# Patient Record
Sex: MALE | Race: WHITE | Employment: OTHER | URBAN - METROPOLITAN AREA
[De-identification: names, ages, dates, MRNs, and addresses within clinical notes are randomized per-mention and may not be internally consistent; named-entity substitution may affect disease eponyms.]

---

## 2022-12-22 ENCOUNTER — HOSPITAL ENCOUNTER (EMERGENCY)
Age: 57
Discharge: HOME | End: 2022-12-22
Attending: STUDENT IN AN ORGANIZED HEALTH CARE EDUCATION/TRAINING PROGRAM
Payer: COMMERCIAL

## 2022-12-22 VITALS
TEMPERATURE: 98.2 F | RESPIRATION RATE: 18 BRPM | BODY MASS INDEX: 23.03 KG/M2 | OXYGEN SATURATION: 98 % | WEIGHT: 170 LBS | DIASTOLIC BLOOD PRESSURE: 91 MMHG | SYSTOLIC BLOOD PRESSURE: 141 MMHG | HEIGHT: 72 IN | HEART RATE: 66 BPM

## 2022-12-22 DIAGNOSIS — S01.81XA FACIAL LACERATION, INITIAL ENCOUNTER: Primary | ICD-10-CM

## 2022-12-22 PROCEDURE — 74011250636 HC RX REV CODE- 250/636: Performed by: STUDENT IN AN ORGANIZED HEALTH CARE EDUCATION/TRAINING PROGRAM

## 2022-12-22 PROCEDURE — 99284 EMERGENCY DEPT VISIT MOD MDM: CPT

## 2022-12-22 PROCEDURE — 74011000250 HC RX REV CODE- 250: Performed by: STUDENT IN AN ORGANIZED HEALTH CARE EDUCATION/TRAINING PROGRAM

## 2022-12-22 PROCEDURE — 90471 IMMUNIZATION ADMIN: CPT

## 2022-12-22 PROCEDURE — 90714 TD VACC NO PRESV 7 YRS+ IM: CPT | Performed by: STUDENT IN AN ORGANIZED HEALTH CARE EDUCATION/TRAINING PROGRAM

## 2022-12-22 PROCEDURE — 75810000293 HC SIMP/SUPERF WND  RPR

## 2022-12-22 RX ORDER — BACITRACIN 500 UNIT/G
1 PACKET (EA) TOPICAL
Status: COMPLETED | OUTPATIENT
Start: 2022-12-22 | End: 2022-12-22

## 2022-12-22 RX ADMIN — TETANUS AND DIPHTHERIA TOXOIDS ADSORBED 0.5 ML: 2; 2 INJECTION INTRAMUSCULAR at 08:43

## 2022-12-22 RX ADMIN — Medication 2 ML: at 08:44

## 2022-12-22 RX ADMIN — Medication 1 PACKET: at 08:45

## 2022-12-22 NOTE — ED TRIAGE NOTES
Pt states he walked into a piece of material sticking out of his truck this am while it was dark. Pt has a laceration to the right side of the face. Pt denies loc. Does not take any blood thinners. Pt does not recall last tetanus shot. Pt denies dizziness.

## 2022-12-22 NOTE — ED PROVIDER NOTES
WOUND REPAIR    Date/Time: 12/22/2022 10:03 AM  [id number] Dr. Mustafa Artist  Preparation: skin prepped with ChloraPrep  Location details: face  Wound length:2.6 - 7.5 cm    Anesthesia:  Local Anesthetic: LET (lido, epi, tetracaine)  Foreign bodies: no foreign bodies  Irrigation solution: saline  Irrigation method: jet lavage  Skin closure: 4-0 nylon  Number of sutures: 4  Technique: simple  Approximation: close  My total time at bedside, performing this procedure was 1-15 minutes. Comments: Gave instructions for wound care and recommend suture removal in 5 days.

## 2022-12-22 NOTE — ED PROVIDER NOTES
Patient is otherwise healthy 77-year-old male who was walking at his work truck when he caught the right side of his face and his aluminum resulted in a skin avulsion of the forehead, laceration of the right temple and a small laceration to the right ear. Wound is hemostatic at this time. Patient's Tdap is out of date. The history is provided by the patient and the spouse. Head Injury   The incident occurred just prior to arrival. The incident occurred at home. The injury mechanism was a cut/puncture wound. The injury was related to work. The wounds were self-inflicted. No protective equipment was used. He came to the ER via personal transport. There is an injury to the Head. The pain is mild. It is unlikely that a foreign body is present. There is no possibility that he inhaled smoke. Associated symptoms include headaches. His tetanus status is unknown. He has been Behaving normally. There were no sick contacts. He has received no recent medical care. No past medical history on file. No past surgical history on file. No family history on file.     Social History     Socioeconomic History    Marital status:      Spouse name: Not on file    Number of children: Not on file    Years of education: Not on file    Highest education level: Not on file   Occupational History    Not on file   Tobacco Use    Smoking status: Not on file    Smokeless tobacco: Not on file   Substance and Sexual Activity    Alcohol use: Not on file    Drug use: Not on file    Sexual activity: Not on file   Other Topics Concern    Not on file   Social History Narrative    Not on file     Social Determinants of Health     Financial Resource Strain: Not on file   Food Insecurity: Not on file   Transportation Needs: Not on file   Physical Activity: Not on file   Stress: Not on file   Social Connections: Not on file   Intimate Partner Violence: Not on file   Housing Stability: Not on file         ALLERGIES: Patient has no known allergies. Review of Systems   Constitutional: Negative. HENT: Negative. Eyes: Negative. Respiratory: Negative. Cardiovascular: Negative. Gastrointestinal: Negative. Endocrine: Negative. Genitourinary: Negative. Musculoskeletal: Negative. Skin:  Positive for wound. Allergic/Immunologic: Negative. Neurological:  Positive for headaches. Hematological: Negative. Psychiatric/Behavioral: Negative. Vitals:    12/22/22 0758 12/22/22 0759 12/22/22 0819   BP: (!) 141/91     Pulse: 66 66    Resp: 16 18    Temp: 98.2 °F (36.8 °C)     SpO2: 97% 98% 98%   Weight: 80.7 kg (178 lb) 77.1 kg (170 lb)    Height: 6' (1.829 m) 6' (1.829 m)             Physical Exam  Vitals and nursing note reviewed. Constitutional:       General: He is not in acute distress. Appearance: Normal appearance. HENT:      Head: Normocephalic. Right Ear: External ear normal.      Left Ear: External ear normal.      Nose: Nose normal.   Eyes:      Extraocular Movements: Extraocular movements intact. Conjunctiva/sclera: Conjunctivae normal.   Cardiovascular:      Rate and Rhythm: Normal rate. Pulses: Normal pulses. Radial pulses are 2+ on the right side and 2+ on the left side. Heart sounds: Normal heart sounds. Pulmonary:      Effort: Pulmonary effort is normal.      Breath sounds: Normal breath sounds. Chest:      Chest wall: No deformity or tenderness. Abdominal:      General: Abdomen is flat. There is no distension. Tenderness: There is no abdominal tenderness. Musculoskeletal:         General: No deformity or signs of injury. Normal range of motion. Cervical back: Normal range of motion and neck supple. No tenderness. Skin:     General: Skin is warm and dry. Capillary Refill: Capillary refill takes less than 2 seconds. Findings: Laceration present. Neurological:      General: No focal deficit present.       Mental Status: He is alert and oriented to person, place, and time. Psychiatric:         Attention and Perception: Attention normal.         Mood and Affect: Mood normal.         Behavior: Behavior normal.        MDM         MEDICATIONS GIVEN:  Medications   tetanus-diphtheria toxoids-Td (Td) 2-2 Lf unit/0.5 mL injection 0.5 mL (0.5 mL IntraMUSCular Given 12/22/22 0843)   lidocaine/EPINEPHrine/tetracaine (L.E.T) topical gel (2 mL Topical Given 12/22/22 0844)   bacitracin 500 unit/gram packet 1 Packet (1 Packet Topical Given 12/22/22 0845)       Differential diagnosis: Headache, concussion, laceration, avulsion    MDM: Patient is a 59-year-old male presented ED with a laceration to the right temple, avulsion to the right forehead and small laceration to the right ear. Doubt head bleed or significant head injury. Patient has minor headache. Tetanus updated. Let gel applied. Laceration repaired per procedure note by KARY Arnold. Further personalized recommendations for outpatient care as below. Key discharge instructions and summary of care: You presented to the ED after receiving facial trauma. Laceration was repaired with 4 nonabsorbable sutures that should be removed in 5 to 7 days. Recommend applying bacitracin twice a day. Keep wounds covered when in the sun for the next 6 months to prevent scarring. Take Tylenol for headache. If you have worsening headache symptoms you could have a concussion, this can cause dizziness, blurred vision, nausea. If symptoms progress and do not resolve in 10 to 14 days follow-up with concussion physical therapy. Patient is stable for discharge. All available radiology and laboratory results have been reviewed with patient and/or available family.   Patient and/or family verbally conveyed their understanding and agreement of the patient's signs, symptoms, diagnosis, treatment and prognosis and additionally agree to follow-up as recommended in the discharge instructions or to return to the Emergency Department should their condition change or worsen prior to their follow-up appointment. All questions have been answered and patient and/or available family express understanding. IMPRESSION:  1. Facial laceration, initial encounter        DISPOSITION: Discharged    Souleymane Urban MD    Procedures

## 2022-12-22 NOTE — DISCHARGE INSTRUCTIONS
You presented to the ED after receiving facial trauma. Laceration was repaired with 4 nonabsorbable sutures that should be removed in 5 to 7 days. Recommend applying bacitracin twice a day. Keep wounds covered when in the sun for the next 6 months to prevent scarring. Take Tylenol for headache. If you have worsening headache symptoms you could have a concussion, this can cause dizziness, blurred vision, nausea. If symptoms progress and do not resolve in 10 to 14 days follow-up with concussion physical therapy.

## 2024-12-03 ENCOUNTER — OFFICE VISIT (OUTPATIENT)
Age: 59
End: 2024-12-03

## 2024-12-03 VITALS
DIASTOLIC BLOOD PRESSURE: 86 MMHG | BODY MASS INDEX: 25 KG/M2 | HEART RATE: 68 BPM | OXYGEN SATURATION: 97 % | TEMPERATURE: 98.8 F | WEIGHT: 184.6 LBS | RESPIRATION RATE: 18 BRPM | SYSTOLIC BLOOD PRESSURE: 118 MMHG | HEIGHT: 72 IN

## 2024-12-03 DIAGNOSIS — R10.2 PELVIC PAIN IN MALE: ICD-10-CM

## 2024-12-03 DIAGNOSIS — J06.9 UPPER RESPIRATORY TRACT INFECTION, UNSPECIFIED TYPE: Primary | ICD-10-CM

## 2024-12-03 RX ORDER — METHYLPREDNISOLONE 4 MG/1
TABLET ORAL
Qty: 1 KIT | Refills: 0 | Status: SHIPPED | OUTPATIENT
Start: 2024-12-03

## 2024-12-03 ASSESSMENT — ENCOUNTER SYMPTOMS
SINUS PRESSURE: 1
COUGH: 1
SORE THROAT: 1
CHEST TIGHTNESS: 1

## 2024-12-03 NOTE — PROGRESS NOTES
12/3/2024   Chevy Chung Jr (: 1965) is a 58 y.o. male, New patient, here for evaluation of the following chief complaint(s):  Cold Symptoms (Cough, nasal congestion, covid/flu A/B was negative yesterday and today. 99.9 fever. Dizzy, body aches, chills. Coughing has caused groin hernia.)     ASSESSMENT/PLAN:  Below is the assessment and plan developed based on review of pertinent history, physical exam, labs, studies, and medications.  1. Upper respiratory tract infection, unspecified type  2. Pelvic pain in male    - medrol dose pack  - f/u with PCP - may be developing right inguinal hernia       Handout given with care instructions  2. OTC for symptom management. Increase fluid intake, ensure adequate nutritional intake.  3. Follow up with PCP as needed.  4. Go to ED with development of any acute symptoms.     Follow up:  Return if symptoms worsen or fail to improve.  Follow up immediately for any new, worsening or changes or if symptoms are not improving over the next 5-7 days.     SUBJECTIVE/OBJECTIVE:  HPI     Cold Symptoms (Cough, nasal congestion, covid/flu A/B was negative yesterday and today. 99.9 fever. Dizzy, body aches, chills. Coughing has caused groin hernia.)      Review of Systems   Constitutional:  Positive for chills, fatigue and fever.   HENT:  Positive for congestion, sinus pressure and sore throat.    Respiratory:  Positive for cough and chest tightness.    Gastrointestinal:         ? hernia         Physical Exam  Constitutional:       Appearance: Normal appearance.   HENT:      Head: Normocephalic.      Right Ear: Ear canal and external ear normal. A middle ear effusion is present.      Left Ear: Ear canal and external ear normal. A middle ear effusion is present.      Nose: Mucosal edema and congestion present.      Right Turbinates: Enlarged.      Left Turbinates: Enlarged.      Right Sinus: No maxillary sinus tenderness or frontal sinus tenderness.      Left Sinus: No maxillary

## 2024-12-03 NOTE — PATIENT INSTRUCTIONS
Please follow up with your primary care provider within 2-5 days if your signs and symptoms have not resolved or worsened.     Please go immediately to the Emergency Department if you develop shortness of breath, chest pain and uncontrollable fever above 100.4F; or develop significant abdominal pain or irreducible protrusion.     Nasal Congestion:  Flonase (over the counter) nasal spray, once a day  Saline irrigation kits help wash out sinuses 1-2 times a day  Normal saline nasal spray     Cough:  Throat lozenges, hot tea, and honey may help  Vicks VapoRub at night to help with cough and relieve muscles aches and pain  If not prescribed a cough medication, Delsym is an option.  It is an over the counter cough medication containing dextromethorphan to help suppress cough at night              If you have high blood pressure, take Coricidin HBP (or the generic form) instead.  Follow instructions on the box.     Congestion:  For thick mucus, take Mucinex (with Guafenesin only) to help thin the mucus.  Follow instructions on the box.  You will need to drink plenty of water with this medication.     Sore Throat:  Lozenges, as needed. Cepacol lozenges will help numb the throat  Chloraseptic spray also helps to numb throat pain  Salt water gargles to soothe throat pain     Sinus pain/pressure:  Warm, wet towel on face to help with facial sinus pain/pressure  Neti Pot or Saline Rinse can be helpful      Headache/Pain Fever/Body Aches:  If you can take NSAIDs, take Ibuprofen 400-800mg every 8 hours as needed  If you cannot take NSAIDs, take Tylenol 325-500mg every 6 hours as needed     Miscellanous:  Zyrtec/Xyzal/Allegra/Claritin during the day or Benadryl at night may help with allergies  Simple foods like chicken noodle soup, smoothies, hot tea with lemon and honey may also help